# Patient Record
Sex: MALE | Race: WHITE | URBAN - METROPOLITAN AREA
[De-identification: names, ages, dates, MRNs, and addresses within clinical notes are randomized per-mention and may not be internally consistent; named-entity substitution may affect disease eponyms.]

---

## 2021-12-21 ENCOUNTER — TELEMEDICINE (OUTPATIENT)
Dept: FAMILY MEDICINE CLINIC | Age: 26
End: 2021-12-21
Payer: COMMERCIAL

## 2021-12-21 VITALS — WEIGHT: 245 LBS | HEIGHT: 75 IN | BODY MASS INDEX: 30.46 KG/M2

## 2021-12-21 DIAGNOSIS — Z11.59 ENCOUNTER FOR SCREENING FOR OTHER VIRAL DISEASES: ICD-10-CM

## 2021-12-21 DIAGNOSIS — Z13.6 SCREENING FOR CARDIOVASCULAR CONDITION: ICD-10-CM

## 2021-12-21 DIAGNOSIS — E66.9 OBESITY (BMI 30.0-34.9): ICD-10-CM

## 2021-12-21 DIAGNOSIS — R53.83 FATIGUE, UNSPECIFIED TYPE: Primary | ICD-10-CM

## 2021-12-21 PROCEDURE — 99203 OFFICE O/P NEW LOW 30 MIN: CPT | Performed by: FAMILY MEDICINE

## 2021-12-21 SDOH — ECONOMIC STABILITY: FOOD INSECURITY: WITHIN THE PAST 12 MONTHS, YOU WORRIED THAT YOUR FOOD WOULD RUN OUT BEFORE YOU GOT MONEY TO BUY MORE.: NEVER TRUE

## 2021-12-21 SDOH — ECONOMIC STABILITY: FOOD INSECURITY: WITHIN THE PAST 12 MONTHS, THE FOOD YOU BOUGHT JUST DIDN'T LAST AND YOU DIDN'T HAVE MONEY TO GET MORE.: NEVER TRUE

## 2021-12-21 ASSESSMENT — ENCOUNTER SYMPTOMS
CHEST TIGHTNESS: 0
ABDOMINAL DISTENTION: 0
COUGH: 0
BACK PAIN: 0
VOMITING: 0
WHEEZING: 0
CONSTIPATION: 0
ABDOMINAL PAIN: 0
NAUSEA: 0
SHORTNESS OF BREATH: 1
DIARRHEA: 0

## 2021-12-21 ASSESSMENT — PATIENT HEALTH QUESTIONNAIRE - PHQ9
SUM OF ALL RESPONSES TO PHQ QUESTIONS 1-9: 0
2. FEELING DOWN, DEPRESSED OR HOPELESS: 0
SUM OF ALL RESPONSES TO PHQ QUESTIONS 1-9: 0
1. LITTLE INTEREST OR PLEASURE IN DOING THINGS: 0
SUM OF ALL RESPONSES TO PHQ9 QUESTIONS 1 & 2: 0
SUM OF ALL RESPONSES TO PHQ QUESTIONS 1-9: 0

## 2021-12-21 ASSESSMENT — SOCIAL DETERMINANTS OF HEALTH (SDOH): HOW HARD IS IT FOR YOU TO PAY FOR THE VERY BASICS LIKE FOOD, HOUSING, MEDICAL CARE, AND HEATING?: NOT HARD AT ALL

## 2021-12-21 NOTE — PROGRESS NOTES
2021    TELEHEALTH EVALUATION -- Audio/Visual (During PYRVS-23 public health emergency)      Giana Mckeon (:  1995) is a 32 y.o. male,New patient, here for evaluation of the following chief complaint(s): Established New Doctor and Fatigue        ASSESSMENT/PLAN:    1. Fatigue, unspecified type  Failing to change as expected. Will do basic labs to rule out certain common medical conditions: hematologic, renal, hepatic, electrolyte imbalances, thyroid disorders and Vitamin D deficiency. -     CBC; Future  -     Comprehensive Metabolic Panel; Future  -     TSH without Reflex; Future  -     Vitamin D 25 Hydroxy; Future  2. Screening for cardiovascular condition  -     Lipid Panel; Future  3. Encounter for screening for other viral diseases  -     HIV Screen; Future  -     Hepatitis C Antibody; Future  -     Varicella Zoster Antibody, IgG; Future  4. Obesity (BMI 30.0-34. 9)  Improving with lifestyle changes  We will do labs: CBC, CMP, TSH with reflex, and lipid profile  Low carb, low fat diet, increase fruits and vegetables, and exercise 4-5 times a week 30-40 minutes a day, or walk 1-2 hours per day, or wear a pedometer and get at least 10,000 steps per day. Francesco Serrano received counseling on the following healthy behaviors: nutrition, exercise, medication adherence and we. i  Reviewed prior labs and weight loss. Discussed use, benefit, and side effects of prescribed medications. Barriers to medication compliance addressed. Patient given educational materials - see patient instructions  All patient questions answered. Patient voiced understanding. The patient's past medical,surgical, social, and family history as well as his current medications and allergies were reviewed as documented in today's encounter. Medications, labs, diagnostic studies, consultations and follow-up as documented in this encounter.     Return in about 6 months (around 2022) for Face-2F-30mins PHYSICAL, VISION screen, PHQ9., FATIGUE. Sign up for Mychart please. PLEASE RUN IMPACT FOR TDAP AND HPV VACCINE    Future Appointments   Date Time Provider Shiv Olivera   2022  8:30 AM [facility]         SUBJECTIVE/OBJECTIVE:  Edy Pompa (:  1995) has requested an audio/video evaluation for the following concern(s):Established New Doctor and Fatigue      Patient is present today with his wife Barak. Fatigue on and off. He is currently training for running a marathon  He reports some shortness of breath after running 2 miles, he says he has been able to pull-through and run 3-4 miles total at once. Denies cough, chest pain or wheezing. Denies shortness of breath otherwise  He was never diagnosed with asthma  Reports occasionally having Diarrhea when drinking beer, otherwise no diarrhea  Patient reports gaining weight during the pandemic and that is why he started to run again, has been trying to lose weight. Patient reports fatigue is mild, but not getting better. He reports increased appetite and that he likes to eat, and has been gaining weight. Has no BP taken recently, advised to have his blood pressure taken and let us know  Denies fever, chills, night sweats. Denies cold or heat intolerance, dry skin, constipation. He is self-employed. He reports he is up-to-date with immunizations I requested them    Denies depression    PHQ-2 Over the past 2 weeks, how often have you been bothered by any of the following problems? Little interest or pleasure in doing things: Not at all  Feeling down, depressed, or hopeless: Not at all  PHQ-2 Score: 0  PHQ-9 Over the past 2 weeks, how often have you been bothered by any of the following problems?   PHQ-9 Total Score: 0        PHQ Scores 2021   PHQ2 Score 0   PHQ9 Score 0     Ht Readings from Last 3 Encounters:   21 6' 3\" (1.905 m)     Wt Readings from Last 3 Encounters:   21 245 lb (111.1 kg) Due for lipids screening. Due to BMI 30.62 kg/M2, Abelino San is due for lipids screening. Abelino San is not eating low fat diet. he is  exercising. he is not taking any over the counter supplements. Abelino San is due for HIV screening due to CDC recommendation to be screened. Abelino San denies high risk behavior. He is currently     Patient is due for hepatitis C screening. Abelino San 's indication is CDC recommendation. Patient is due for varicella antibody check due to his  age group and CDC recommendation. he denies any rash, or recent illness. he denies any signs or symptoms of chickenpox. Review of Systems   Constitutional: Positive for fatigue and unexpected weight change. Negative for activity change, appetite change, chills, diaphoresis and fever. Respiratory: Positive for shortness of breath (DONIS after running for 2 miles). Negative for cough, chest tightness and wheezing. Cardiovascular: Negative for chest pain, palpitations and leg swelling. Gastrointestinal: Negative for abdominal distention, abdominal pain, constipation, diarrhea, nausea and vomiting. Endocrine: Positive for polyphagia. Negative for cold intolerance, heat intolerance, polydipsia and polyuria. Genitourinary: Negative for difficulty urinating. Musculoskeletal: Negative for arthralgias and back pain. Skin: Negative for rash. Allergic/Immunologic: Negative for environmental allergies. Neurological: Negative for numbness and headaches. Hematological: Does not bruise/bleed easily. Psychiatric/Behavioral: Negative for dysphoric mood and sleep disturbance.          Prior to Visit Medications    Not on File       Social History     Tobacco Use    Smoking status: Never Smoker    Smokeless tobacco: Never Used   Vaping Use    Vaping Use: Never used   Substance Use Topics    Alcohol use: Yes     Comment: occasionaly     Drug use: Never          PHYSICAL EXAMINATION:    Vital Signs: (As obtained by patient/caregiver or practitioner observation)  -vital signs stable and within normal limits except obesity per BMI, BMI 30.62 kg/M2  Patient-Reported Vitals 12/21/2021   Patient-Reported Weight 245 lb   Patient-Reported Height 6'3           Intensity of pain is 0 out of 10    Constitutional: [x] Appears well-developed and well-nourished [x] No apparent distress      [x] Abnormal-obese    Mental status  [x] Alert and awake  [x] Oriented to person/place/time [x]Able to follow commands      Eyes:  EOM    [x]  Normal  [] Abnormal-  Sclera  [x]  Normal  [] Abnormal -         Discharge [x]  None visible  [] Abnormal -    HENT:   [x] Normocephalic, atraumatic. [] Abnormal   [x] Mouth/Throat: Mucous membranes are moist.     External Ears [x] Normal  [] Abnormal-     Neck: [x] No visualized mass     Pulmonary/Chest: [x] Respiratory effort normal.  [x] No visualized signs of difficulty breathing or respiratory distress        [] Abnormal        Musculoskeletal:   [x] Normal gait with no signs of ataxia         [x] Normal range of motion of neck        [] Abnormal-       Neurological:        [x] No Facial Asymmetry (Cranial nerve 7 motor function) (limited exam to video visit)          [x] No gaze palsy        [] Abnormal-            Skin:        [x] No significant exanthematous lesions or discoloration noted on facial skin         [] Abnormal-            Psychiatric:      [x] No Hallucinations       [x]Mood is normal      [x]Behavior is normal      [x]Judgment is normal      [x]Thought content is normal       [] Abnormal-     Other pertinent observable physical exam findings- none    Due to this being a TeleHealth encounter, evaluation of the following organ systems is limited: Vitals/Constitutional/EENT/Resp/CV/GI//MS/Neuro/Skin/Heme-Lymph-Imm.       No labs available in the chart  No immunizations available, he says he has been up-to-date with immunizations      Orders Placed This Encounter   Procedures    CBC Standing Status:   Future     Standing Expiration Date:   12/21/2022    Comprehensive Metabolic Panel     Standing Status:   Future     Standing Expiration Date:   2/17/2022    Lipid Panel     Standing Status:   Future     Standing Expiration Date:   12/21/2022     Order Specific Question:   Is Patient Fasting?/# of Hours     Answer:   8-10 Hours, water ok to drink    TSH without Reflex     Standing Status:   Future     Standing Expiration Date:   12/21/2022    Vitamin D 25 Hydroxy     Standing Status:   Future     Standing Expiration Date:   12/21/2022    HIV Screen     Standing Status:   Future     Standing Expiration Date:   12/21/2022    Hepatitis C Antibody     Standing Status:   Future     Standing Expiration Date:   12/21/2022    Varicella Zoster Antibody, IgG     Standing Status:   Future     Standing Expiration Date:   12/21/2022       There are no discontinued medications. Nikia Estrada, was evaluated through a synchronous (real-time) audio-video encounter. The patient (or guardian if applicable) is aware that this is a billable service. Verbal consent to proceed has been obtained within the past 12 months. The visit was conducted pursuant to the emergency declaration under the [facility], [address] [place] [address] authority and the [employer] Act. Patient identification was verified     Patient is present with his wife Barak. The patient was located in a state where the provider was credentialed to provide care. On this date 12/21/2021 I have spent 35 minutes reviewing previous notes, test results and face to face with the patient discussing the diagnosis and importance of compliance with the treatment plan as well as documenting on the day of the visit. --Enrique Mei MD on 12/21/2021 at 7:25 PM    An electronic signature was used to authenticate this note.

## 2023-01-19 ENCOUNTER — OFFICE VISIT (OUTPATIENT)
Dept: FAMILY MEDICINE CLINIC | Age: 28
End: 2023-01-19
Payer: COMMERCIAL

## 2023-01-19 VITALS
OXYGEN SATURATION: 96 % | HEART RATE: 69 BPM | TEMPERATURE: 97.5 F | WEIGHT: 255 LBS | SYSTOLIC BLOOD PRESSURE: 128 MMHG | DIASTOLIC BLOOD PRESSURE: 86 MMHG | BODY MASS INDEX: 31.71 KG/M2 | HEIGHT: 75 IN

## 2023-01-19 DIAGNOSIS — R63.5 UNINTENDED WEIGHT GAIN: ICD-10-CM

## 2023-01-19 DIAGNOSIS — Z00.00 ENCOUNTER FOR WELL ADULT EXAM WITHOUT ABNORMAL FINDINGS: Primary | ICD-10-CM

## 2023-01-19 DIAGNOSIS — Z13.6 SCREENING FOR CARDIOVASCULAR CONDITION: ICD-10-CM

## 2023-01-19 DIAGNOSIS — Z11.59 ENCOUNTER FOR SCREENING FOR OTHER VIRAL DISEASES: ICD-10-CM

## 2023-01-19 PROCEDURE — 99395 PREV VISIT EST AGE 18-39: CPT | Performed by: FAMILY MEDICINE

## 2023-01-19 SDOH — ECONOMIC STABILITY: FOOD INSECURITY: WITHIN THE PAST 12 MONTHS, THE FOOD YOU BOUGHT JUST DIDN'T LAST AND YOU DIDN'T HAVE MONEY TO GET MORE.: NEVER TRUE

## 2023-01-19 SDOH — ECONOMIC STABILITY: FOOD INSECURITY: WITHIN THE PAST 12 MONTHS, YOU WORRIED THAT YOUR FOOD WOULD RUN OUT BEFORE YOU GOT MONEY TO BUY MORE.: NEVER TRUE

## 2023-01-19 ASSESSMENT — PATIENT HEALTH QUESTIONNAIRE - PHQ9
SUM OF ALL RESPONSES TO PHQ QUESTIONS 1-9: 0
2. FEELING DOWN, DEPRESSED OR HOPELESS: 0
SUM OF ALL RESPONSES TO PHQ9 QUESTIONS 1 & 2: 0
1. LITTLE INTEREST OR PLEASURE IN DOING THINGS: 0
SUM OF ALL RESPONSES TO PHQ QUESTIONS 1-9: 0

## 2023-01-19 ASSESSMENT — ENCOUNTER SYMPTOMS
ABDOMINAL DISTENTION: 0
DIARRHEA: 0
COUGH: 0
ABDOMINAL PAIN: 0
SHORTNESS OF BREATH: 0
WHEEZING: 0
CHEST TIGHTNESS: 0
NAUSEA: 0
VOMITING: 0
BACK PAIN: 0
CONSTIPATION: 0

## 2023-01-19 ASSESSMENT — SOCIAL DETERMINANTS OF HEALTH (SDOH): HOW HARD IS IT FOR YOU TO PAY FOR THE VERY BASICS LIKE FOOD, HOUSING, MEDICAL CARE, AND HEATING?: NOT HARD AT ALL

## 2023-01-19 NOTE — PATIENT INSTRUCTIONS
Well Visit, Ages 25 to 72: Care Instructions  Well visits can help you stay healthy. Your doctor has checked your overall health and may have suggested ways to take good care of yourself. Your doctor also may have recommended tests. You can help prevent illness with healthy eating, good sleep, vaccinations, regular exercise, and other steps. Get the tests that you and your doctor decide on. Depending on your age and risks, examples might include screening for diabetes; hepatitis C; HIV; and cervical, breast, lung, and colon cancer. Screening helps find diseases before any symptoms appear. Eat healthy foods. Choose fruits, vegetables, whole grains, lean protein, and low-fat dairy foods. Limit saturated fat and reduce salt. Limit alcohol. Men should have no more than 2 drinks a day. Women should have no more than 1. For some people, no alcohol is the best choice. Exercise. Get at least 30 minutes of exercise on most days of the week. Walking can be a good choice. Reach and stay at your healthy weight. This will lower your risk for many health problems. Take care of your mental health. Try to stay connected with friends, family, and community, and find ways to manage stress. If you're feeling depressed or hopeless, talk to someone. A counselor can help. If you don't have a counselor, talk to your doctor. Talk to your doctor if you think you may have a problem with alcohol or drug use. This includes prescription medicines and illegal drugs. Avoid tobacco and nicotine: Don't smoke, vape, or chew. If you need help quitting, talk to your doctor. Practice safer sex. Getting tested, using condoms or dental dams, and limiting sex partners can help prevent STIs. Use birth control if it's important to you to prevent pregnancy. Talk with your doctor about your choices and what might be best for you. Prevent problems where you can.  Protect your skin from too much sun, wash your hands, brush your teeth twice a day, and wear a seat belt in the car. Where can you learn more? Go to http://www.higgins.com/ and enter P072 to learn more about \"Well Visit, Ages 25 to 72: Care Instructions. \"  Current as of: March 9, 2022               Content Version: 13.5  © 8410-5261 [employer]. Care instructions adapted under license by [facility] ([facility]). If you have questions about a medical condition or this instruction, always ask your healthcare professional. [address] any warranty or liability for your use of this information.

## 2023-01-19 NOTE — PROGRESS NOTES
Visit Information    Have you changed or started any medications since your last visit including any over-the-counter medicines, vitamins, or herbal medicines? no   Have you stopped taking any of your medications? Is so, why? -  no  Are you having any side effects from any of your medications? - no    Have you seen any other physician or provider since your last visit?  no   Have you had any other diagnostic tests since your last visit?  no   Have you been seen in the emergency room and/or had an admission in a hospital since we last saw you?  no   Have you had your routine dental cleaning in the past 6 months?  yes -      Do you have an active MyChart account? If no, what is the barrier?   Yes    Patient Care Team:  Aaron Spear MD as PCP - General (Family Medicine)  Aaron Spear MD as PCP - [facility]    Medical History Review  Past Medical, Family, and Social History reviewed and does contribute to the patient presenting condition    Health Maintenance   Topic Date Due    COVID-19 Vaccine (1) Never done    Varicella vaccine (1 of 2 - 2-dose childhood series) Never done    HIV screen  Never done    Hepatitis C screen  Never done    Flu vaccine (1) Never done    Depression Screen  12/21/2022    DTaP/Tdap/Td vaccine (2 - Td or Tdap) 05/10/2026    Hepatitis A vaccine  Aged Out    Hib vaccine  Aged Out    Meningococcal (ACWY) vaccine  Aged Out    Pneumococcal 0-64 years Vaccine  Aged Out

## 2023-01-19 NOTE — PROGRESS NOTES
2023      Vignesh Holcomb (:  1995) is a 32 y.o. male, here for a preventive medicine evaluation, Annual Exam and Immunizations (NO TO FLU VACCINE)   . ASSESSMENT/PLAN:    1. Encounter for well adult exam without abnormal findings    Low carb, low fat diet, increase fruits and vegetables, and exercise 4-5 times a week 30-40 minutes a day, or walk 1-2 hours per day, or wear a pedometer and get at least 10,000 steps per day. Dental exam 2-3 times /year advised. Immunizations reviewed. Health Maintenance reviewed   Smoking cessation counseling given, absolutely not to start smoking    Annual eye exam advised. Check labs  -     TSH; Future  -     CBC; Future  -     Comprehensive Metabolic Panel; Future  2. Unintended weight gain  Low carb, low fat diet, increase fruits and vegetables, and exercise 4-5 times a week 30-40 minutes a day, or walk 1-2 hours per day, or wear a pedometer and get at least 10,000 steps per day. -     TSH; Future  -     CBC; Future  -     Comprehensive Metabolic Panel; Future  3. Encounter for screening for other viral diseases  -     HIV Screen; Future  -     Hepatitis C Antibody; Future  -     Varicella Zoster Antibody, IgG; Future  -     Hepatitis A Antibody, Total; Future  4. Screening for cardiovascular condition  -     Lipid Panel; Future    Sami Rabago received counseling on the following healthy behaviors: nutrition, exercise, medication adherence, and weight loss  Reviewed prior labs and health maintenance  Discussed use, benefit, and side effects of prescribed medications. Barriers to medication compliance addressed. Patient given educational materials - see patient instructions  All patient questions answered. Patient voiced understanding. The patient's past medical, surgical, social, and family history as well as his  current medications and allergies were reviewed as documented in today's encounter.       Medications, labs, diagnostic studies, consultations and follow-up as documented in thisencounter. Return in 1 year (on 1/20/2024) for Visit type PHYSICAL, VISION screen, PHQ9. Wyonia Litter    Future Appointments   Date Time Provider Shiv Olivera   1/23/2024  9:30 AM Mateus Mo MD [facility]         Patient Active Problem List   Diagnosis    Obesity (BMI 30.0-34. 9)       Prior to Visit Medications    Not on File        No Known Allergies    History reviewed. No pertinent past medical history. History reviewed. No pertinent surgical history. .  Social History     Tobacco Use    Smoking status: Never    Smokeless tobacco: Never   Vaping Use    Vaping Use: Never used   Substance Use Topics    Alcohol use: Yes     Comment: occasionaly     Drug use: Never       Family History   Problem Relation Age of Onset    No Known Problems Mother     Asthma Father     Inflam Bowel Dis Brother         Chrohn's    Prostate Cancer Neg Hx     Colon Cancer Neg Hx        ADVANCE DIRECTIVE: N, <no information>    HIRAM / Sara Valdes is here for Annual Exam and Immunizations (NO TO FLU VACCINE)     Comes with wife, Lonnie Villalpando. Current concerns:none    Urinary symptoms: no    Sexually active: Yes     Wears seatbelts: yes     Regular exercise: yes  Ever been transfused or tattooed?: no    Last eye exam: up to date: Yes    Abnormal visual screening. Fab Kovacs  reports he is up-to-date with his eye exam last done 1 year ago, advised to make an appointment with ophthalmologist..     Vision Screening    Right eye Left eye Both eyes   Without correction 20/20 20/15 20/13   With correction          Hearing:normal :Yes    Last dental exam and preventative dental cleaning: up to date : Yes    Nutritional assessment: Body mass index is 31.87 kg/m². Elevated.   Obesity per BMI    Weight is increased, there is unintentional weight gain of 10 pounds in 2 years, he did weigh himself with his keys and phone in his pockets, so most likely just 6 pounds unintentional weight gain  Wt Readings from Last 3 Encounters:   01/19/23 255 lb (115.7 kg)   12/21/21 245 lb (111.1 kg)       Healthful diet and Physical activity counseling to prevent CVD- low carb, low fat diet, increase fruits and vegetables, and exercise 4-5 times a day 30-40minutes a day discussed    Nicotine dependence. no    Counseling given: Yes      Alcohol use: yes - rarely    Immunization History   Administered Date(s) Administered    Hepatitis A Adult (Havrix, Vaqta) 05/10/2016    Tdap (Boostrix, Adacel) 05/10/2016    Typhoid Vi capsular polysaccharide (Typhim VI) 05/10/2016         COVID-19 vaccine: No: Declines    Tdap one time, then Td every 10 years-up to date:  Yes    Influenza annually-up to date:  No: Absolutely declines despite counseling    PPSV 23 in all adults 19-63 yo with chronic conditions,smokers, alcoholism,  nursing home residents; then PCV 13 at 71 yo-up to date: N/A    Colon cancer screening recommended at 39years old  The patient has NO family history of colon cancer      The patient has NO family history of cancer. No results found for: PSA, PSADIA      Blood pressure is normal.  BP Readings from Last 3 Encounters:   01/19/23 128/86       Pulse is normal.  Pulse Readings from Last 3 Encounters:   01/19/23 69       Lipid screening -never done    No results found for: CHOL  No results found for: TRIG  No results found for: HDL  No results found for: LDLCHOLESTEROL, LDLCALC  No results found for: [facility]    Cardiovascular risk is: Low    The ASCVD Risk score (Jamal CAMILO, et al., 2019) failed to calculate for the following reasons: The 2019 ASCVD risk score is only valid for ages 36 to 78    Hepatitis C screening-   No results found for: HAV, HEPAIGM, HEPBIGM, HEPBCAB, HBEAG, HEPCAB         [x]Negative depression screening.   PHQ Scores 1/19/2023 12/21/2021   PHQ2 Score 0 0   PHQ9 Score 0 0       Health Maintenance   Topic Date Due    COVID-19 Vaccine (1) Never done    Varicella vaccine (1 of 2 - 2-dose childhood series) Never done    HIV screen  Never done    Hepatitis C screen  Never done    Depression Screen  12/21/2022    Flu vaccine (1) 06/30/2023 (Originally 8/1/2022)    DTaP/Tdap/Td vaccine (2 - Td or Tdap) 05/10/2026    Hepatitis A vaccine  Aged Out    Hib vaccine  Aged Out    Meningococcal (ACWY) vaccine  Aged Out    Pneumococcal 0-64 years Vaccine  Aged Out       -rest of complaints with corresponding details per ROS    Review of Systems   Constitutional:  Positive for unexpected weight change. Negative for activity change, appetite change, chills, diaphoresis, fatigue and fever.   HENT:  Negative for congestion, dental problem and hearing loss.    Eyes:  Negative for visual disturbance.   Respiratory:  Negative for cough, chest tightness, shortness of breath and wheezing.    Cardiovascular:  Negative for chest pain, palpitations and leg swelling.   Gastrointestinal:  Negative for abdominal distention, abdominal pain, constipation, diarrhea, nausea and vomiting.   Endocrine: Negative for cold intolerance, heat intolerance, polydipsia, polyphagia and polyuria.   Genitourinary:  Negative for decreased urine volume, difficulty urinating, frequency and urgency.   Musculoskeletal:  Negative for back pain.   Skin:  Negative for rash.   Allergic/Immunologic: Negative for environmental allergies.   Neurological:  Negative for dizziness, weakness and headaches.   Hematological:  Does not bruise/bleed easily.   Psychiatric/Behavioral:  Negative for decreased concentration, dysphoric mood and sleep disturbance. The patient is not nervous/anxious.        -vital signs stable and within normal limits except obesity per BMI    /86   Pulse 69   Temp 97.5 °F (36.4 °C)   Ht 6' 3\" (1.905 m)   Wt 255 lb (115.7 kg)   SpO2 96%   BMI 31.87 kg/m²   Vitals:    01/19/23 1537   BP: 128/86   Pulse: 69   Temp: 97.5 °F (36.4 °C)   SpO2: 96%   Weight: 255 lb (115.7 kg)   Height: 6'  3\" (1.905 m)     Estimated body mass index is 31.87 kg/m² as calculated from the following:    Height as of this encounter: 6' 3\" (1.905 m). Weight as of this encounter: 255 lb (115.7 kg). Physical Exam  Vitals and nursing note reviewed. Constitutional:       General: He is not in acute distress. Appearance: Normal appearance. He is well-developed. He is not diaphoretic. HENT:      Head: Normocephalic and atraumatic. Right Ear: Hearing, tympanic membrane, ear canal and external ear normal.      Left Ear: Hearing, tympanic membrane, ear canal and external ear normal.      Mouth/Throat:      Comments: I did not examine the mouth due to coronavirus pandemic and wearing masks. Eyes:      General: Lids are normal. No scleral icterus. Right eye: No discharge. Left eye: No discharge. Extraocular Movements: Extraocular movements intact. Conjunctiva/sclera: Conjunctivae normal.   Neck:      Thyroid: No thyromegaly. Cardiovascular:      Rate and Rhythm: Normal rate and regular rhythm. Heart sounds: Normal heart sounds. No murmur heard. Pulmonary:      Effort: Pulmonary effort is normal. No respiratory distress. Breath sounds: Normal breath sounds. No wheezing or rales. Chest:      Chest wall: No tenderness. Abdominal:      General: Bowel sounds are normal. There is no distension. Palpations: Abdomen is soft. There is no hepatomegaly or splenomegaly. Tenderness: There is no abdominal tenderness. Musculoskeletal:         General: No tenderness. Normal range of motion. Cervical back: Normal range of motion and neck supple. Right lower leg: No edema. Left lower leg: No edema. Skin:     General: Skin is warm and dry. Capillary Refill: Capillary refill takes less than 2 seconds. Findings: No rash. Neurological:      Mental Status: He is alert and oriented to person, place, and time. Cranial Nerves: No cranial nerve deficit. Motor: No abnormal muscle tone. Coordination: Coordination normal.      Deep Tendon Reflexes: Reflexes normal.   Psychiatric:         Mood and Affect: Mood normal.         Behavior: Behavior normal.         Thought Content: Thought content normal.         Judgment: Judgment normal.       No flowsheet data found. No labs available, he has never completed prior labs    No results found for: WBC, HGB, HCT, MCV, PLT    No results found for: NA, K, CL, CO2, BUN, CREATININE, GLUCOSE, CALCIUM     No results found for: ALT, AST, GGT, ALKPHOS, BILITOT    No results found for: TSHFT4, TSH    No results found for: LDLCALC, LDLCHOLESTEROL, LDLDIRECT    No results found for: LABA1C    No results found for: IOXNZDIQ29    No results found for: FOLATE    No results found for: IRON, TIBC, FERRITIN    No results found for: VITD25      No orders of the defined types were placed in this encounter. There are no discontinued medications.       Orders Placed This Encounter   Procedures    HIV Screen     Standing Status:   Future     Standing Expiration Date:   1/18/2024    Hepatitis C Antibody     Standing Status:   Future     Standing Expiration Date:   1/18/2024    Varicella Zoster Antibody, IgG     Standing Status:   Future     Standing Expiration Date:   1/18/2024    Hepatitis A Antibody, Total     Standing Status:   Future     Standing Expiration Date:   1/19/2024    TSH     Standing Status:   Future     Standing Expiration Date:   1/19/2024    Lipid Panel     Standing Status:   Future     Standing Expiration Date:   1/19/2024     Order Specific Question:   Is Patient Fasting?/# of Hours     Answer:   8-10 Hours, water ok to drink    CBC     Standing Status:   Future     Standing Expiration Date:   1/19/2024    Comprehensive Metabolic Panel     Standing Status:   Future     Standing Expiration Date:   1/19/2024           This note was completed by using the assistance of a speech-recognition program. However, inadvertent computerized transcription errors may be present. Although every effort was made to ensure accuracy, no guarantees can be provided that every mistake has been identified and corrected by editing. An electronic signature was used to authenticate this note.     Electronically signed by Kathy Petersen MD on 1/19/2023 at 8:12 PM